# Patient Record
Sex: MALE | Race: BLACK OR AFRICAN AMERICAN | NOT HISPANIC OR LATINO | Employment: UNEMPLOYED | URBAN - METROPOLITAN AREA
[De-identification: names, ages, dates, MRNs, and addresses within clinical notes are randomized per-mention and may not be internally consistent; named-entity substitution may affect disease eponyms.]

---

## 2024-10-24 ENCOUNTER — HOSPITAL ENCOUNTER (EMERGENCY)
Facility: HOSPITAL | Age: 49
Discharge: HOME | End: 2024-10-24
Attending: EMERGENCY MEDICINE

## 2024-10-24 ENCOUNTER — APPOINTMENT (EMERGENCY)
Dept: RADIOLOGY | Facility: HOSPITAL | Age: 49
End: 2024-10-24

## 2024-10-24 VITALS
TEMPERATURE: 96.4 F | RESPIRATION RATE: 17 BRPM | DIASTOLIC BLOOD PRESSURE: 82 MMHG | SYSTOLIC BLOOD PRESSURE: 176 MMHG | HEART RATE: 92 BPM | OXYGEN SATURATION: 98 %

## 2024-10-24 DIAGNOSIS — R07.9 CHEST PAIN: Primary | ICD-10-CM

## 2024-10-24 DIAGNOSIS — K80.20 CHOLELITHIASIS: ICD-10-CM

## 2024-10-24 LAB
2HR DELTA HS TROPONIN: 1 NG/L
ALBUMIN SERPL BCG-MCNC: 4 G/DL (ref 3.5–5)
ALP SERPL-CCNC: 65 U/L (ref 34–104)
ALT SERPL W P-5'-P-CCNC: 32 U/L (ref 7–52)
ANION GAP SERPL CALCULATED.3IONS-SCNC: 7 MMOL/L (ref 4–13)
AST SERPL W P-5'-P-CCNC: 35 U/L (ref 13–39)
ATRIAL RATE: 87 BPM
BASOPHILS # BLD AUTO: 0.06 THOUSANDS/ΜL (ref 0–0.1)
BASOPHILS NFR BLD AUTO: 1 % (ref 0–1)
BILIRUB SERPL-MCNC: 0.33 MG/DL (ref 0.2–1)
BUN SERPL-MCNC: 14 MG/DL (ref 5–25)
CALCIUM SERPL-MCNC: 9.3 MG/DL (ref 8.4–10.2)
CARDIAC TROPONIN I PNL SERPL HS: 5 NG/L
CARDIAC TROPONIN I PNL SERPL HS: 6 NG/L
CHLORIDE SERPL-SCNC: 99 MMOL/L (ref 96–108)
CO2 SERPL-SCNC: 31 MMOL/L (ref 21–32)
CREAT SERPL-MCNC: 1 MG/DL (ref 0.6–1.3)
D DIMER PPP FEU-MCNC: <0.27 UG/ML FEU
EOSINOPHIL # BLD AUTO: 0.05 THOUSAND/ΜL (ref 0–0.61)
EOSINOPHIL NFR BLD AUTO: 1 % (ref 0–6)
ERYTHROCYTE [DISTWIDTH] IN BLOOD BY AUTOMATED COUNT: 14.5 % (ref 11.6–15.1)
GFR SERPL CREATININE-BSD FRML MDRD: 87 ML/MIN/1.73SQ M
GLUCOSE SERPL-MCNC: 153 MG/DL (ref 65–140)
HCT VFR BLD AUTO: 49.6 % (ref 36.5–49.3)
HGB BLD-MCNC: 15.4 G/DL (ref 12–17)
IMM GRANULOCYTES # BLD AUTO: 0.03 THOUSAND/UL (ref 0–0.2)
IMM GRANULOCYTES NFR BLD AUTO: 0 % (ref 0–2)
LYMPHOCYTES # BLD AUTO: 3.68 THOUSANDS/ΜL (ref 0.6–4.47)
LYMPHOCYTES NFR BLD AUTO: 38 % (ref 14–44)
MCH RBC QN AUTO: 26.1 PG (ref 26.8–34.3)
MCHC RBC AUTO-ENTMCNC: 31 G/DL (ref 31.4–37.4)
MCV RBC AUTO: 84 FL (ref 82–98)
MONOCYTES # BLD AUTO: 1.02 THOUSAND/ΜL (ref 0.17–1.22)
MONOCYTES NFR BLD AUTO: 11 % (ref 4–12)
NEUTROPHILS # BLD AUTO: 4.79 THOUSANDS/ΜL (ref 1.85–7.62)
NEUTS SEG NFR BLD AUTO: 49 % (ref 43–75)
NRBC BLD AUTO-RTO: 0 /100 WBCS
P AXIS: 32 DEGREES
PLATELET # BLD AUTO: 197 THOUSANDS/UL (ref 149–390)
PMV BLD AUTO: 11.1 FL (ref 8.9–12.7)
POTASSIUM SERPL-SCNC: 4.3 MMOL/L (ref 3.5–5.3)
PR INTERVAL: 176 MS
PROT SERPL-MCNC: 7.7 G/DL (ref 6.4–8.4)
QRS AXIS: 117 DEGREES
QRSD INTERVAL: 76 MS
QT INTERVAL: 344 MS
QTC INTERVAL: 413 MS
RBC # BLD AUTO: 5.89 MILLION/UL (ref 3.88–5.62)
SODIUM SERPL-SCNC: 137 MMOL/L (ref 135–147)
T WAVE AXIS: -6 DEGREES
VENTRICULAR RATE: 87 BPM
WBC # BLD AUTO: 9.63 THOUSAND/UL (ref 4.31–10.16)

## 2024-10-24 PROCEDURE — 85379 FIBRIN DEGRADATION QUANT: CPT

## 2024-10-24 PROCEDURE — 71046 X-RAY EXAM CHEST 2 VIEWS: CPT

## 2024-10-24 PROCEDURE — 93308 TTE F-UP OR LMTD: CPT | Performed by: EMERGENCY MEDICINE

## 2024-10-24 PROCEDURE — 80053 COMPREHEN METABOLIC PANEL: CPT

## 2024-10-24 PROCEDURE — 85025 COMPLETE CBC W/AUTO DIFF WBC: CPT

## 2024-10-24 PROCEDURE — 36415 COLL VENOUS BLD VENIPUNCTURE: CPT

## 2024-10-24 PROCEDURE — 93005 ELECTROCARDIOGRAM TRACING: CPT

## 2024-10-24 PROCEDURE — 76705 ECHO EXAM OF ABDOMEN: CPT | Performed by: EMERGENCY MEDICINE

## 2024-10-24 PROCEDURE — 84484 ASSAY OF TROPONIN QUANT: CPT

## 2024-10-24 PROCEDURE — 93010 ELECTROCARDIOGRAM REPORT: CPT | Performed by: INTERNAL MEDICINE

## 2024-10-24 PROCEDURE — 99285 EMERGENCY DEPT VISIT HI MDM: CPT | Performed by: EMERGENCY MEDICINE

## 2024-10-24 PROCEDURE — 99285 EMERGENCY DEPT VISIT HI MDM: CPT

## 2024-10-24 NOTE — ED ATTENDING ATTESTATION
10/24/2024  I, Selene Hinds MD, saw and evaluated the patient. I have discussed the patient with the resident/non-physician practitioner and agree with the resident's/non-physician practitioner's findings, Plan of Care, and MDM as documented in the resident's/non-physician practitioner's note, except where noted. All available labs and Radiology studies were reviewed.  I was present for key portions of any procedure(s) performed by the resident/non-physician practitioner and I was immediately available to provide assistance.       At this point I agree with the current assessment done in the Emergency Department.  I have conducted an independent evaluation of this patient a history and physical is as follows:  Patient here chest pain.  This is a 56-year-old male with history of hypertension who is presenting with chest pain that he says is sudden in onset in his right chest, last for about 5 minutes, fades away spontaneously.  It is not exertional.  He has had 4 separate episodes over the last 1 week, 1 was associate with the eating, but the others were not.  It is not exertional.  He has not had fevers, chills, cough, or lateralizing limb swelling.  He also complains of bilateral knee pain associated with sitting in a prolonged fashion in a plane.  No rashes or skin changes.  Not had symptoms like this before.  Wife is here for chest pain as well.  Review of systems otherwise -12 systems reviewed.  On exam vital signs were reviewed.  Patient is awake, alert, interactive.  The patient's pupils are equally round reactive to light.  Oropharynx is clear with moist mucous membranes.  Neck is supple and nontender with no adenopathy or JVD.  Heart is regular with no murmurs, rubs, or gallops.  Lungs are clear and equal with no wheezes, rales, or rhonchi.  Abdomen is soft and nontender with no masses, rebound, or guarding. There is no CVA tenderness.  The patient was completely exposed.  There is no skin breakdown.   There are no rashes or skin changes.  Extremities are warm and well perfused with good pulses. The patient has normal strength, sensation, and cranial nerves. MEDICAL DECISION MAKING    Number and Complexity of Problems  Differential diagnosis: PE, cardiac chest pain, viral syndrome, gallbladder    Medical Decision Making Data  External documents reviewed:   My EKG interpretation: S1Q3T3, no old comparison, nonspecific T wave flattening laterally, no ischemia  My CT interpretation:   My X-ray interpretation: Unremarkable  My ultrasound interpretation: Bedside ultrasound performed by ultrasound team, patient with this but no cholecystitis    XR chest 2 views   Final Result      No acute cardiopulmonary disease.            Resident: ERI Madison I, the attending radiologist, have reviewed the images and agree with the final report above.      Workstation performed: [id number]             Labs Reviewed   CBC AND DIFFERENTIAL - Abnormal       Result Value Ref Range Status    WBC 9.63  4.31 - 10.16 Thousand/uL Final    RBC 5.89 (*) 3.88 - 5.62 Million/uL Final    Hemoglobin 15.4  12.0 - 17.0 g/dL Final    Hematocrit 49.6 (*) 36.5 - 49.3 % Final    MCV 84  82 - 98 fL Final    MCH 26.1 (*) 26.8 - 34.3 pg Final    MCHC 31.0 (*) 31.4 - 37.4 g/dL Final    RDW 14.5  11.6 - 15.1 % Final    MPV 11.1  8.9 - 12.7 fL Final    Platelets 197  149 - 390 Thousands/uL Final    nRBC 0  /100 WBCs Final    Segmented % 49  43 - 75 % Final    Immature Grans % 0  0 - 2 % Final    Lymphocytes % 38  14 - 44 % Final    Monocytes % 11  4 - 12 % Final    Eosinophils Relative 1  0 - 6 % Final    Basophils Relative 1  0 - 1 % Final    Absolute Neutrophils 4.79  1.85 - 7.62 Thousands/µL Final    Absolute Immature Grans 0.03  0.00 - 0.20 Thousand/uL Final    Absolute Lymphocytes 3.68  0.60 - 4.47 Thousands/µL Final    Absolute Monocytes 1.02  0.17 - 1.22 Thousand/µL Final    Eosinophils Absolute 0.05  0.00 - 0.61 Thousand/µL Final    Basophils  "Absolute 0.06  0.00 - 0.10 Thousands/µL Final   D-DIMER, QUANTITATIVE - Normal    D-Dimer, Quant <0.27  <0.50 ug/ml FEU Final    Comment: Reference and upper limits to exclude DVT and PE are the same.  Do not use to exclude if clinical symptoms are present.    Narrative:     In the evaluation for possible pulmonary embolism, in the appropriate (Well's Score of 4 or less) patient, the age adjusted d-dimer cutoff for this patient can be calculated as:    Age x 0.01 (in ug/mL) for Age-adjusted D-dimer exclusion threshold for a patient over 50 years.   HS TROPONIN I 0HR - Normal    hs TnI 0hr 5  \"Refer to ACS Flowchart\"- see link ng/L Final    Comment:                                              Initial (time 0) result  If >=50 ng/L, Myocardial injury suggested ;  Type of myocardial injury and treatment strategy  to be determined.  If 5-49 ng/L, a delta result at 2 hours and or 4 hours will be needed to further evaluate.  If <4 ng/L, and chest pain has been >3 hours since onset, patient may qualify for discharge based on the HEART score in the ED.  If <5 ng/L and <3hours since onset of chest pain, a delta result at 2 hours will be needed to further evaluate.    HS Troponin 99th Percentile URL of a Health Population=12 ng/L with a 95% Confidence Interval of 8-18 ng/L.    Second Troponin (time 2 hours)  If calculated delta >= 20 ng/L,  Myocardial injury suggested ; Type of myocardial injury and treatment strategy to be determined.  If 5-49 ng/L and the calculated delta is 5-19 ng/L, consult medical service for evaluation.  Continue evaluation for ischemia on ecg and other possible etiology and repeat hs troponin at 4 hours.  If delta is <5 ng/L at 2 hours, consider discharge based on risk stratification via the HEART score (if in ED), or NORMA risk score in IP/Observation.    HS Troponin 99th Percentile URL of a Health Population=12 ng/L with a 95% Confidence Interval of 8-18 ng/L.   COMPREHENSIVE METABOLIC PANEL   HS " TROPONIN I 2HR   HS TROPONIN I 4HR       Labs reviewed by me are significant for: D-dimer negative, Trope 5    Clinical decision rules/scores are significant for:     Discussed case with:   Considered admission for:     Treatment and Disposition  ED course: Patient seen and examined, will plan to do delta troponin, labs.  Reassessment: Dimer negative, Trope is 5, will do second Trope, anticipate discharge home with atypical chest pain  Shared decision making:   Code status:     ED Course         Critical Care Time  Procedures

## 2024-10-24 NOTE — ED PROCEDURE NOTE
Procedure  POC Cardiac US    Date/Time: 10/24/2024 3:09 PM    Performed by: Ramses Thomas DO  Authorized by: Ramses Thomas DO    Patient location:  ED  Other Assisting Provider: Yes (comment) (Chinedu Hinds)    Procedure details:     Exam Type:  Diagnostic    Indications: suspected volume depletion and chest pain      Assessment / Evaluation for: cardiac function, pericardial effusion, inferior vena cava for fluid responsiveness, intravascular volume status and right heart strain (suspected pulmonary embolism)      Exam Type: initial exam      Image quality: limited diagnostic      Image availability:  Images available in PACS and video obtained  Patient Details:     Cardiac Rhythm:  Regular    Mechanical ventilation: No    Cardiac findings:     Echo technique: limited 2D      Views obtained: parasternal long axis, parasternal short axis and apical      Pericardial effusion: absent      Tamponade physiology: absent      Wall motion: normal      LV systolic function: normal      RV dilation: none    IVC findings:     IVC Size: indeterminate    POC Biliary US    Date/Time: 10/24/2024 3:11 PM    Performed by: Ramses Thomas DO  Authorized by: Ramses Thomas DO    Patient location:  ED  Performed by:  Resident  Other Assisting Provider: Yes (comment) (Chinedu Hinds MD)    Procedure details:     Exam Type:  Diagnostic    Indications: upper right quadrant abdominal pain      Assessment for:  Cholecystitis and cholelithiasis    Views obtained: gallbladder (transverse and longitudinal), common bile duct and portal triad      Image quality: diagnostic      Image availability:  Images available in PACS, video obtained and still images obtained  Findings:     Cholelithiasis: identified      Common bile duct:  Normal    Gallbladder wall:  Normal    Pericholecystic fluid: not identified      Sonographic Bernal's sign: negative      Polyps: not identified      Mass: not identified    Interpretation:     Biliary ultrasound  impressions: cholelithiasis                     Ramses Thomas, DO  10/24/24 7153

## 2024-10-24 NOTE — ED PROVIDER NOTES
Time reflects when diagnosis was documented in both MDM as applicable and the Disposition within this note       Time User Action Codes Description Comment    10/24/2024  4:17 PM Michel Hilario Add [R07.9] Chest pain     10/24/2024  4:17 PM Michel Hilario Add [K80.20] Cholelithiasis           ED Disposition       ED Disposition   Discharge    Condition   Stable    Date/Time   u Oct 24, 2024  4:17 PM    Comment   Rosalinda Ramirez discharge to home/self care.                   Assessment & Plan       Medical Decision Making  Differential diagnosis include PE, ACS, costochondritis, anxiety, pneumonia, pneumothorax.  Ordering labs for ACS rule out and as well as dimer for PE restratification.  He does not PERC out due to his age however he is low risk on Wells.  Patient's ACS workup and PE workup were negative.  Chest x-ray was within normal limits as well.  Bedside ultrasound team did ultrasound of his gallbladder and found cholelithiasis without cholecystitis or any inflammatory changes.  General surgery referral placed.  This could be a potential causes of his right-sided chest pain.  Patient is agreeable and stable for discharge.    Amount and/or Complexity of Data Reviewed  Labs: ordered.  Radiology: ordered.  ECG/medicine tests:  Decision-making details documented in ED Course.        ED Course as of 10/24/24 1713   Thu Oct 24, 2024   1409 ECG 12 lead  Normal sinus rhythm.  S1 Q3 T3 noted.       Medications - No data to display    ED Risk Strat Scores                           SBIRT 20yo+      Flowsheet Row Most Recent Value   Initial Alcohol Screen: US AUDIT-C     1. How often do you have a drink containing alcohol? 0 Filed at: 10/24/2024 1358   2. How many drinks containing alcohol do you have on a typical day you are drinking?  0 Filed at: 10/24/2024 1356   3a. Male UNDER 65: How often do you have five or more drinks on one occasion? 0 Filed at: 10/24/2024 1353   3b. FEMALE Any Age, or MALE 65+: How often do  you have 4 or more drinks on one occassion? 0 Filed at: 10/24/2024 3450   Audit-C Score 0 Filed at: 10/24/2024 6529   FLAVIO: How many times in the past year have you...    Used an illegal drug or used a prescription medication for non-medical reasons? Never Filed at: 10/24/2024 4892                            History of Present Illness       Chief Complaint   Patient presents with    Chest Pain     Since intermittent chest pain last week with increased duration today       History reviewed. No pertinent past medical history.   History reviewed. No pertinent surgical history.   History reviewed. No pertinent family history.   Social History     Tobacco Use    Smoking status: Never    Smokeless tobacco: Never      E-Cigarette/Vaping      E-Cigarette/Vaping Substances      I have reviewed and agree with the history as documented.     Patient is a 48-year-old male with past medical history of hypertension, obesity who comes in today complaining of right-sided intermittent chest pain that started about a week ago.  He states that he gets most of his medical care from [facility] and he flew in about 1 to 2 weeks ago.  He states that he is unable to associate the pain with any activities however he did get 1 episode while he was eating and the other episode while he was walking.  He also complains of some diaphoresis with the chest pain but denies any nausea, vomiting, shortness of breath.  Denies any calf pain, cancer treatment, prior history of PE, DVT.  He states that the pain is sharp and nonradiating.  He also states the pain is nonreproducible.      Chest Pain  Associated symptoms: no abdominal pain, no cough, no fever, no palpitations, no shortness of breath and not vomiting        Review of Systems   Constitutional:  Negative for fever.   Respiratory:  Negative for cough and shortness of breath.    Cardiovascular:  Positive for chest pain. Negative for palpitations.   Gastrointestinal:  Negative for abdominal pain and  vomiting.   Genitourinary:  Negative for dysuria.   Skin:  Negative for rash.   Neurological:  Negative for syncope.   All other systems reviewed and are negative.          Objective       ED Triage Vitals [10/24/24 1356]   Temperature Pulse Blood Pressure Respirations SpO2 Patient Position - Orthostatic VS   (!) 96.4 °F (35.8 °C) 92 (!) 176/82 17 98 % Sitting      Temp Source Heart Rate Source BP Location FiO2 (%) Pain Score    Tympanic Monitor Right arm -- 8      Vitals      Date and Time Temp Pulse SpO2 Resp BP Pain Score FACES Pain Rating User   10/24/24 1356 96.4 °F (35.8 °C) 92 98 % 17 176/82 8 -- AT            Physical Exam  Vitals and nursing note reviewed.   Constitutional:       General: He is not in acute distress.     Appearance: He is well-developed.   HENT:      Head: Normocephalic and atraumatic.   Eyes:      Conjunctiva/sclera: Conjunctivae normal.   Cardiovascular:      Rate and Rhythm: Normal rate and regular rhythm.      Heart sounds: No murmur heard.  Pulmonary:      Effort: Pulmonary effort is normal. No respiratory distress.      Breath sounds: Normal breath sounds.   Abdominal:      Palpations: Abdomen is soft.      Tenderness: There is no abdominal tenderness.   Musculoskeletal:         General: No swelling.      Cervical back: Neck supple.   Skin:     General: Skin is warm and dry.      Capillary Refill: Capillary refill takes less than 2 seconds.   Neurological:      Mental Status: He is alert.   Psychiatric:         Mood and Affect: Mood normal.         Results Reviewed       Procedure Component Value Units Date/Time    HS Troponin I 2hr [[id number]]  (Normal) Collected: 10/24/24 1627    Lab Status: Final result Specimen: Blood from Arm, Left Updated: 10/24/24 1700     hs TnI 2hr 6 ng/L      Delta 2hr hsTnI 1 ng/L     HS Troponin I 4hr [[id number]]     Lab Status: No result Specimen: Blood     HS Troponin 0hr (reflex protocol) [[id number]]  (Normal) Collected: 10/24/24 1439    Lab Status:  Final result Specimen: Blood from Arm, Left Updated: 10/24/24 1519     hs TnI 0hr 5 ng/L     D-Dimer [[id number]]  (Normal) Collected: 10/24/24 1439    Lab Status: Final result Specimen: Blood from Arm, Left Updated: 10/24/24 1514     D-Dimer, Quant <0.27 ug/ml FEU     Narrative:      In the evaluation for possible pulmonary embolism, in the appropriate (Well's Score of 4 or less) patient, the age adjusted d-dimer cutoff for this patient can be calculated as:    Age x 0.01 (in ug/mL) for Age-adjusted D-dimer exclusion threshold for a patient over 50 years.    CBC and differential [[id number]]  (Abnormal) Collected: 10/24/24 1439    Lab Status: Final result Specimen: Blood from Arm, Left Updated: 10/24/24 1451     WBC 9.63 Thousand/uL      RBC 5.89 Million/uL      Hemoglobin 15.4 g/dL      Hematocrit 49.6 %      MCV 84 fL      MCH 26.1 pg      MCHC 31.0 g/dL      RDW 14.5 %      MPV 11.1 fL      Platelets 197 Thousands/uL      nRBC 0 /100 WBCs      Segmented % 49 %      Immature Grans % 0 %      Lymphocytes % 38 %      Monocytes % 11 %      Eosinophils Relative 1 %      Basophils Relative 1 %      Absolute Neutrophils 4.79 Thousands/µL      Absolute Immature Grans 0.03 Thousand/uL      Absolute Lymphocytes 3.68 Thousands/µL      Absolute Monocytes 1.02 Thousand/µL      Eosinophils Absolute 0.05 Thousand/µL      Basophils Absolute 0.06 Thousands/µL     Comprehensive metabolic panel [[id number]] Collected: 10/24/24 1439    Lab Status: In process Specimen: Blood from Arm, Left Updated: 10/24/24 1445            XR chest 2 views   Final Interpretation by Christopher Bojorquez MD (10/24 1532)      No acute cardiopulmonary disease.            Resident: ERI Madison I, the attending radiologist, have reviewed the images and agree with the final report above.      Workstation performed: [id number]             Procedures    ED Medication and Procedure Management   None     Patient's Medications    No medications on file       ED  SEPSIS DOCUMENTATION   Time reflects when diagnosis was documented in both MDM as applicable and the Disposition within this note       Time User Action Codes Description Comment    10/24/2024  4:17 PM Michel Hilario Add [R07.9] Chest pain     10/24/2024  4:17 PM Michel Hilario Add [K80.20] Cholelithiasis                  Michel Hilario DO  10/24/24 6758